# Patient Record
Sex: FEMALE | URBAN - METROPOLITAN AREA
[De-identification: names, ages, dates, MRNs, and addresses within clinical notes are randomized per-mention and may not be internally consistent; named-entity substitution may affect disease eponyms.]

---

## 2020-09-13 ENCOUNTER — EMERGENCY (EMERGENCY)
Facility: HOSPITAL | Age: 25
LOS: 1 days | Discharge: HOME | End: 2020-09-13
Attending: EMERGENCY MEDICINE | Admitting: EMERGENCY MEDICINE
Payer: COMMERCIAL

## 2020-09-13 VITALS
DIASTOLIC BLOOD PRESSURE: 68 MMHG | SYSTOLIC BLOOD PRESSURE: 98 MMHG | TEMPERATURE: 98 F | HEART RATE: 82 BPM | OXYGEN SATURATION: 98 % | RESPIRATION RATE: 17 BRPM | HEIGHT: 65 IN | WEIGHT: 119.93 LBS

## 2020-09-13 VITALS
RESPIRATION RATE: 17 BRPM | SYSTOLIC BLOOD PRESSURE: 98 MMHG | HEART RATE: 96 BPM | DIASTOLIC BLOOD PRESSURE: 58 MMHG | TEMPERATURE: 99 F | OXYGEN SATURATION: 96 %

## 2020-09-13 LAB — ETHANOL SERPL-MCNC: 300 MG/DL — HIGH

## 2020-09-13 PROCEDURE — 99284 EMERGENCY DEPT VISIT MOD MDM: CPT

## 2020-09-13 RX ORDER — ONDANSETRON 8 MG/1
4 TABLET, FILM COATED ORAL ONCE
Refills: 0 | Status: COMPLETED | OUTPATIENT
Start: 2020-09-13 | End: 2020-09-13

## 2020-09-13 RX ORDER — SODIUM CHLORIDE 9 MG/ML
1000 INJECTION INTRAMUSCULAR; INTRAVENOUS; SUBCUTANEOUS ONCE
Refills: 0 | Status: COMPLETED | OUTPATIENT
Start: 2020-09-13 | End: 2020-09-13

## 2020-09-13 RX ADMIN — SODIUM CHLORIDE 1000 MILLILITER(S): 9 INJECTION INTRAMUSCULAR; INTRAVENOUS; SUBCUTANEOUS at 01:31

## 2020-09-13 RX ADMIN — ONDANSETRON 4 MILLIGRAM(S): 8 TABLET, FILM COATED ORAL at 01:31

## 2020-09-13 NOTE — ED ADULT NURSE NOTE - NSIMPLEMENTINTERV_GEN_ALL_ED
Implemented All Universal Safety Interventions:  [place] to call system. Call bell, personal items and telephone within reach. Instruct patient to call for assistance. Room bathroom lighting operational. Non-slip footwear when patient is off stretcher. Physically safe environment: no spills, clutter or unnecessary equipment. Stretcher in lowest position, wheels locked, appropriate side rails in place.

## 2020-09-13 NOTE — ED PROVIDER NOTE - PHYSICAL EXAMINATION
Gen - AOB, unkempt, AVPU - verbal  Skin - warm, dry, intact   HEENT -airway patent  CV - No loud murmurs  Resp - CTAB  GI - No distention or tenderness  MS - No deformities  Neuro - Withdraws and localizes to pain.  No facial asymmetry.  Can follow basic commands

## 2020-09-13 NOTE — ED ADULT NURSE NOTE - PAIN: PRESENCE, MLM
Carlos calling they recd two sets of dosing instructions need to clarify for Metoprolol   [phone]   denies pain/discomfort

## 2020-09-13 NOTE — ED PROVIDER NOTE - PATIENT PORTAL LINK FT
5 You can access the FollowMyHealth Patient Portal offered by [facility] by registering at the following website: [email or web address]. By joining [email or web address]’s FollowMyHealth portal, you will also be able to view your health information using other applications (apps) compatible with our system.

## 2020-09-13 NOTE — ED ADULT NURSE NOTE - OBJECTIVE STATEMENT
Patient biba for acute intoxication. Patient responsive to light touch, speech is slurred. Patient is resting in NAD. Will continue to assess.

## 2020-09-13 NOTE — ED PROVIDER NOTE - OBJECTIVE STATEMENT
Patient BIBEMS from suspected alcohol intoxication without seizure, abnormal vitals. No previous ED visits related to alcohol intoxication.  History and ROS limited due to current state.  As per EMS they were called by PD.  Found in taxi cab + fresh vomit.  No personal contacts present.

## 2020-09-13 NOTE — ED ADULT TRIAGE NOTE - CHIEF COMPLAINT QUOTE
Pt biba for AMS. As per EMS, pt  called for ambulance for pt vomiting. PT responsive to gentle touch. PT reports alcohol use tonight. No obvious injuries or deformities noted. Respirations are even and unlabored.

## 2020-09-13 NOTE — ED ADULT NURSE REASSESSMENT NOTE - NS ED NURSE REASSESS COMMENT FT1
Received from SOPHY García, breathing unlabored, respirations even. pt pending metabolization. Will continue to monitor. Received from SOPHY García, breathing unlabored, respirations even. Fluids infusing well. pt pending metabolization. Will continue to monitor.

## 2022-09-07 NOTE — ED PROVIDER NOTE - CLINICAL SUMMARY MEDICAL DECISION MAKING FREE TEXT BOX
I have personally seen and examined the patient. I have collaborated with and supervised the
Patient BIBEMS for alcohol intoxication.  No previous visits to this ED for substance abuse. History and ROS limited due to altered state.  No evidence of head or extremity trauma.  No vital sign derangements.  Abdomen not distended.  Observe to clinical sobriety.

## 2023-07-25 NOTE — ED ADULT NURSE NOTE - CAS TRG GEN SKIN CONDITION
Care Transitions focused note:          Patient called me requesting assistance with admission to assisted living.      Call to [facility] assisted [facility] in [place].  Requested medical info for screening.  Pt did give me verbal permission to fax for referral.        Mayra Denise will screen and reach out to patient directly to coordinate admission.     BLESSING Carlos          Referral to Diana Padilla, [facility], [facility], [facility] care coordinator to assist with placement concerns.    [facility] in [place] is also looking at patient.    BLESSING Carlos    Warm